# Patient Record
(demographics unavailable — no encounter records)

---

## 2024-12-04 NOTE — HISTORY OF PRESENT ILLNESS
[Home] : at home, [unfilled] , at the time of the visit. [Medical Office: ([facility] [place])___] : at the medical office located in  [FreeTextEntry1] : Ms. Dawn is a 34 year old female that [facility] for blood pressure management postpartum.   She delivered a baby boy at 36 weeks gestation,  secondary due to the development of hypertension at 35 weeks and symptoms of preeclampsia.  Patient was treated with IV Magnesium X 24 hours for seizure prophylaxis and treated with Nifedipine for blood pressure control.  +Family history is unknown. Patient was adopted, without known medical family history.  Blood pressure today:  120's/ 80's on Nifedipine ER 60 mg QD  Denies any issues with shortness of breath, chest discomfort or palpitations.

## 2024-12-04 NOTE — ASSESSMENT
[FreeTextEntry1] : Ms. Dawn is a 34 year old female that presents to [facility] for blood pressure management postpartum.   She delivered a baby boy at 36 weeks gestation,  secondary due to the development of gestational hypertension at 35 weeks and symptoms of preeclampsia.  Patient was treated with IV Magnesium X 24 hours for seizure prophylaxis and treated with Nifedipine for blood pressure control.  +Family history is unknown. Patient was adopted, without known medical family history.  #Gestational hypertension   Blood pressure reported today to be in good control   Requested BP log for one week and will titrate medication as needed   Continue on Nifedipine ER 60 mg daily.   Patient is to watch out for signs and symptoms of hypotension.   Watch sodium intake and increase fluid intake.  .#Adverse Cardiovascular Risk Factors  Personal history of PCOS, gestational hypertension, preeclampsia Family history unknown  Recommending a baseline cardiovascular evaluation 3 months postpartum to assess risk In-office physical examination and 12 lead EKG Echocardiogram to assess cardiac structure and function Exercise stress testing to assess functional status Full blood work panel:  CBC,CMP, Hgb A1C, TSH, Lipid profile  - Encouraged patient to participate in  healthy exercise (when cleared by OB) and eating habits, focusing on a Mediterranean style of eating and aiming for the recommended 150 minutes per week of moderate physical activity.  - Encouraged the patient to find healthy outlets and coping mechanisms to help manage stress, such as physical activity/exercise, reducing workload if possible, spending time with family and friends, engaging in an enjoyable hobby, or using meditation or mindfulness techniques.  I spent 40 minutes evaluating patient's history, family medical history and blood pressure management, ordering tests and providing documentation.

## 2024-12-04 NOTE — HISTORY OF PRESENT ILLNESS
[Home] : at home, [unfilled] , at the time of the visit. [Medical Office: ([facility])___] : at the medical office located in  [FreeTextEntry1] : Ms. Dawn is a 34 year old female that [facility] for blood pressure management postpartum.   She delivered a baby boy at 36 weeks gestation,  secondary due to the development of hypertension at 35 weeks and symptoms of preeclampsia.  Patient was treated with IV Magnesium X 24 hours for seizure prophylaxis and treated with Nifedipine for blood pressure control.  +Family history is unknown. Patient was adopted, without known medical family history.  Blood pressure today:  120's/ 80's on Nifedipine ER 60 mg QD  Denies any issues with shortness of breath, chest discomfort or palpitations.

## 2024-12-17 NOTE — HISTORY OF PRESENT ILLNESS
[Delivery Date: ___] : on [unfilled] [] : delivered by vaginal delivery [Male] : Delivery History: baby boy [Wt. ___] : weighing [unfilled] [Breastfeeding] : currently nursing [Back to Normal] : is back to normal in size [None] : no vaginal bleeding [Normal] : the vagina was normal [Examination Of The Breasts] : breasts are normal [Doing Well] : is doing well [FreeTextEntry8] : 6wk check. 34 year old female presents s/p  @36wks - PEC. Pt is doing well and is taking Nifedipine 30mg - BPs have been within nml limits.  [de-identified] : Delivered by . [de-identified] : Pt is cleared for all nml activities. Discussed BC options, condoms for contraception. RTO in 3-4 months for an annual. Wean off Procardia per Cardio

## 2024-12-17 NOTE — END OF VISIT
[FreeTextEntry3] : I, Joelle Neftali, acted as a scribe on behalf of Dr. Marie Kumar D.O. on 12/17/2024.  All medical entries made by the scribe were at my, Dr. Marie Kumar D.O, direction and personally dictated by me on 12/17/2024. I have reviewed the chart and agree that the record accurately reflects my personal performance of the history, physical exam, assessment and plan. I have also personally directed, reviewed, and agreed with the chart.

## 2025-03-06 NOTE — HISTORY OF PRESENT ILLNESS
[FreeTextEntry1] : 46 yr old presents for an annual. Pt is doing well w/o complaints. Pt stopped breast feeding 2 months ago. Pt stopped Procardia last month.   OBHx: baby boy weighing [id number] delivered by vaginal delivery on 11/04/2024 GYNHx: heavy menses, ovarian cysts, no hx of abn paps, +HPV 7/2022 PMHx: hypothyroid, IBS, lyme FamHx: No sig family hx SocialHx: pt was adopted, pt works as a  PSHx: none MEDS: ,folic acid, synthroid 50mcg, lynzess, ALL: PCN/Omnicef  Preventative Visit: -Mammo: 10/01/2023

## 2025-03-06 NOTE — PLAN
[FreeTextEntry1] : 46 yr old presents for an annual.  PLAN -Pap done -Pt has a f/u w/cardiology next month -Rx mammo/ sono -RTO in 1 yr

## 2025-03-06 NOTE — END OF VISIT
[FreeTextEntry3] : I, Halley Vargas, acted as a scribe on behalf of Dr. Marie Kumar D.O. on 03/06/2025.   All medical entries made by the scribe were at my, Dr. Marie Kumar D.O., direction and personally dictated by me on 03/06/2025. I have reviewed the chart and agree that the record accurately reflects my personal performance of the history, physical exam, assessment and plan. I have also personally directed, reviewed, and agreed with the chart.